# Patient Record
Sex: FEMALE | Race: WHITE | NOT HISPANIC OR LATINO
[De-identification: names, ages, dates, MRNs, and addresses within clinical notes are randomized per-mention and may not be internally consistent; named-entity substitution may affect disease eponyms.]

---

## 2018-01-19 ENCOUNTER — APPOINTMENT (OUTPATIENT)
Dept: PULMONOLOGY | Facility: CLINIC | Age: 38
End: 2018-01-19
Payer: COMMERCIAL

## 2018-01-19 VITALS
HEIGHT: 62 IN | BODY MASS INDEX: 24.11 KG/M2 | DIASTOLIC BLOOD PRESSURE: 70 MMHG | HEART RATE: 102 BPM | OXYGEN SATURATION: 99 % | TEMPERATURE: 102.9 F | WEIGHT: 131 LBS | SYSTOLIC BLOOD PRESSURE: 112 MMHG

## 2018-01-19 DIAGNOSIS — R05 COUGH: ICD-10-CM

## 2018-01-19 DIAGNOSIS — J06.9 ACUTE UPPER RESPIRATORY INFECTION, UNSPECIFIED: ICD-10-CM

## 2018-01-19 PROCEDURE — 99202 OFFICE O/P NEW SF 15 MIN: CPT

## 2020-12-16 PROBLEM — J06.9 URI, ACUTE: Status: RESOLVED | Noted: 2018-01-20 | Resolved: 2020-12-16

## 2022-02-14 ENCOUNTER — APPOINTMENT (OUTPATIENT)
Age: 42
End: 2022-02-14
Payer: COMMERCIAL

## 2022-02-14 DIAGNOSIS — R82.90 UNSPECIFIED ABNORMAL FINDINGS IN URINE: ICD-10-CM

## 2022-02-14 DIAGNOSIS — R82.998 OTHER ABNORMAL FINDINGS IN URINE: ICD-10-CM

## 2022-02-14 PROCEDURE — 99203 OFFICE O/P NEW LOW 30 MIN: CPT

## 2022-02-14 RX ORDER — SULFAMETHOXAZOLE AND TRIMETHOPRIM 800; 160 MG/1; MG/1
800-160 TABLET ORAL
Qty: 6 | Refills: 0 | Status: COMPLETED | COMMUNITY
Start: 2021-12-09

## 2022-02-14 RX ORDER — AZITHROMYCIN 250 MG/1
250 TABLET, FILM COATED ORAL
Qty: 1 | Refills: 0 | Status: COMPLETED | COMMUNITY
Start: 2018-01-20 | End: 2022-02-14

## 2022-02-21 PROBLEM — R82.998 CRYSTALLURIA: Status: ACTIVE | Noted: 2022-02-21

## 2022-02-21 LAB
APPEARANCE: ABNORMAL
BACTERIA UR CULT: NORMAL
BACTERIA: ABNORMAL
BILIRUBIN URINE: NEGATIVE
BLOOD URINE: NORMAL
CALCIUM OXALATE CRYSTALS: ABNORMAL
COLOR: ABNORMAL
GLUCOSE QUALITATIVE U: NEGATIVE
HYALINE CASTS: 4 /LPF
KETONES URINE: NEGATIVE
LEUKOCYTE ESTERASE URINE: NEGATIVE
MICROSCOPIC-UA: NORMAL
MYCOPLASMA HOMINIS CULTURE: NEGATIVE
NITRITE URINE: NEGATIVE
PH URINE: 5.5
PROTEIN URINE: ABNORMAL
RED BLOOD CELLS URINE: 1 /HPF
SPECIFIC GRAVITY URINE: 1.02
SQUAMOUS EPITHELIAL CELLS: 3 /HPF
UREAPLASMA CULTURE: NEGATIVE
UROBILINOGEN URINE: NORMAL
WHITE BLOOD CELLS URINE: 1 /HPF

## 2022-02-21 NOTE — REVIEW OF SYSTEMS
[Negative] : Heme/Lymph [History of kidney stones] : denies history of kidney stones [FreeTextEntry6] : foul smelling urine

## 2022-02-21 NOTE — HISTORY OF PRESENT ILLNESS
[FreeTextEntry1] : 40 yo F presents with history of UTI about 2 months ago\par Symptoms at that time - LUTS, dysuria, back pain, gross hematuria\par This was her first UTI ever and resolved with abx\par Last week - recurrence of back pain and smelly urine\par Per pt, drank a ton of water and symptoms resolved after a day\par Drinks 50 ounces of water, 1 cup of coffee\par Voiding about 3 -4 times per day\par no urinary symptoms\par normal bowel movements\par 2 children,  x2\par LMP = yesterday\par sexually active, no STI history

## 2022-02-21 NOTE — ASSESSMENT
[FreeTextEntry1] : 41y o F with history of recent UTI presents with foul smelling urine and some back pain\par \par - discussed possible etiologies for UTI. Spent extensive period of time discussed behavioral modification including adequate hydration, cutting back on caffeine intake, timed voiding during the day, importance of controlling any diabetes and chronic constipation and how all of these things could potentially increase risk for persistent or recurrent UTI.\par - UA, culture, ureaplasma/mycoplasma\par

## 2022-03-07 ENCOUNTER — APPOINTMENT (OUTPATIENT)
Dept: UROLOGY | Facility: CLINIC | Age: 42
End: 2022-03-07

## 2024-05-03 ENCOUNTER — APPOINTMENT (OUTPATIENT)
Dept: SURGICAL ONCOLOGY | Facility: CLINIC | Age: 44
End: 2024-05-03
Payer: COMMERCIAL

## 2024-05-03 VITALS
DIASTOLIC BLOOD PRESSURE: 80 MMHG | OXYGEN SATURATION: 99 % | SYSTOLIC BLOOD PRESSURE: 160 MMHG | WEIGHT: 110 LBS | BODY MASS INDEX: 20.24 KG/M2 | HEIGHT: 62 IN | HEART RATE: 56 BPM

## 2024-05-03 DIAGNOSIS — C50.911 MALIGNANT NEOPLASM OF UNSPECIFIED SITE OF RIGHT FEMALE BREAST: ICD-10-CM

## 2024-05-03 PROCEDURE — 99205 OFFICE O/P NEW HI 60 MIN: CPT

## 2024-05-03 NOTE — PHYSICAL EXAM
[de-identified] : resolving ecchymosis right breast 12:00 from recent biopsy. no masses or adenopathy bilaterally. A-cup breasts.

## 2024-05-03 NOTE — ADDENDUM
[FreeTextEntry1] : I, Nikki Cárdenas, acted solely as a scribe for Dr. Wm Brunner on this date 05/03/2024.

## 2024-05-03 NOTE — HISTORY OF PRESENT ILLNESS
[de-identified] : Patient is a 42 y/o female who presents an initial consultation for newly diagnosed right breast cancer.  Bx (4/24/24): Right breast 12:00 10 cm FN, core bx: -Invasive ductal carcinoma and DCIS, the invasive carcinoma is well to moderately differentiated, spans 0.7 cm in sampling - DCIS is of the micropapillary and cribriform types with intermediate to focal high grade nuclei and punctuate necrosis -Surrounding breast tissue shows atypical lobular hyperplasia (ALH) -ER+/OK+, HER2 FISH negative   6 month f/u US on 4/10/24 revealed new indeterminate palpable nodule in right breast 12:00, recc USGbx. (BI-RADS 4) Probably benign findings in the 12:00 right breast 2 cm FN , 3:00, 5:00, 6:00 and left breast 8:00 for which recc 6 month f/u US.   11/6/2023 - Right breast 12:00 2 cmFN 7 mm benign-appearing debris-containing cyst, increased from 4 mm - recc 6 month f/u - BI-RADS 3  Denies any family history of breast or ovarian cancer. Mother had pancreatic cancer. Father had prostate cancer. Prior needly biopsy many years ago.

## 2024-05-03 NOTE — ASSESSMENT
[FreeTextEntry1] : Right breast invasive cancer w/ DCIS & ALH ER+/ID+, HER2 by FISH negative I had a long discussion with the pt and her  regarding her diagnosis, prognosis and all management options  Surgical procedure discussed in detail All questions answered

## 2024-05-08 ENCOUNTER — OUTPATIENT (OUTPATIENT)
Dept: OUTPATIENT SERVICES | Facility: HOSPITAL | Age: 44
LOS: 1 days | End: 2024-05-08

## 2024-05-08 VITALS
OXYGEN SATURATION: 97 % | HEART RATE: 67 BPM | TEMPERATURE: 99 F | DIASTOLIC BLOOD PRESSURE: 85 MMHG | SYSTOLIC BLOOD PRESSURE: 141 MMHG | WEIGHT: 106.92 LBS | HEIGHT: 62.5 IN | RESPIRATION RATE: 16 BRPM

## 2024-05-08 DIAGNOSIS — C50.412 MALIGNANT NEOPLASM OF UPPER-OUTER QUADRANT OF LEFT FEMALE BREAST: ICD-10-CM

## 2024-05-08 DIAGNOSIS — C50.419 MALIGNANT NEOPLASM OF UPPER-OUTER QUADRANT OF UNSPECIFIED FEMALE BREAST: ICD-10-CM

## 2024-05-08 LAB
ANION GAP SERPL CALC-SCNC: 14 MMOL/L — SIGNIFICANT CHANGE UP (ref 7–14)
BLD GP AB SCN SERPL QL: NEGATIVE — SIGNIFICANT CHANGE UP
BUN SERPL-MCNC: 12 MG/DL — SIGNIFICANT CHANGE UP (ref 7–23)
CALCIUM SERPL-MCNC: 9 MG/DL — SIGNIFICANT CHANGE UP (ref 8.4–10.5)
CHLORIDE SERPL-SCNC: 102 MMOL/L — SIGNIFICANT CHANGE UP (ref 98–107)
CO2 SERPL-SCNC: 22 MMOL/L — SIGNIFICANT CHANGE UP (ref 22–31)
CREAT SERPL-MCNC: 0.58 MG/DL — SIGNIFICANT CHANGE UP (ref 0.5–1.3)
EGFR: 114 ML/MIN/1.73M2 — SIGNIFICANT CHANGE UP
GLUCOSE SERPL-MCNC: 79 MG/DL — SIGNIFICANT CHANGE UP (ref 70–99)
HCG UR QL: NEGATIVE — SIGNIFICANT CHANGE UP
HCT VFR BLD CALC: 40.4 % — SIGNIFICANT CHANGE UP (ref 34.5–45)
HGB BLD-MCNC: 13.8 G/DL — SIGNIFICANT CHANGE UP (ref 11.5–15.5)
MCHC RBC-ENTMCNC: 30.3 PG — SIGNIFICANT CHANGE UP (ref 27–34)
MCHC RBC-ENTMCNC: 34.2 GM/DL — SIGNIFICANT CHANGE UP (ref 32–36)
MCV RBC AUTO: 88.8 FL — SIGNIFICANT CHANGE UP (ref 80–100)
NRBC # BLD: 0 /100 WBCS — SIGNIFICANT CHANGE UP (ref 0–0)
NRBC # FLD: 0 K/UL — SIGNIFICANT CHANGE UP (ref 0–0)
PLATELET # BLD AUTO: 231 K/UL — SIGNIFICANT CHANGE UP (ref 150–400)
POTASSIUM SERPL-MCNC: 3.7 MMOL/L — SIGNIFICANT CHANGE UP (ref 3.5–5.3)
POTASSIUM SERPL-SCNC: 3.7 MMOL/L — SIGNIFICANT CHANGE UP (ref 3.5–5.3)
RBC # BLD: 4.55 M/UL — SIGNIFICANT CHANGE UP (ref 3.8–5.2)
RBC # FLD: 13.2 % — SIGNIFICANT CHANGE UP (ref 10.3–14.5)
RH IG SCN BLD-IMP: POSITIVE — SIGNIFICANT CHANGE UP
RH IG SCN BLD-IMP: POSITIVE — SIGNIFICANT CHANGE UP
SODIUM SERPL-SCNC: 138 MMOL/L — SIGNIFICANT CHANGE UP (ref 135–145)
WBC # BLD: 9.48 K/UL — SIGNIFICANT CHANGE UP (ref 3.8–10.5)
WBC # FLD AUTO: 9.48 K/UL — SIGNIFICANT CHANGE UP (ref 3.8–10.5)

## 2024-05-08 RX ORDER — SODIUM CHLORIDE 9 MG/ML
1000 INJECTION, SOLUTION INTRAVENOUS
Refills: 0 | Status: DISCONTINUED | OUTPATIENT
Start: 2024-05-15 | End: 2024-05-16

## 2024-05-08 NOTE — H&P PST ADULT - BREASTS COMMENTS
Preop dx Malignant neoplasm upper-outer Quadrant left female breast Preop dx Malignant neoplasm upper-outer Quadrant of  breast

## 2024-05-08 NOTE — H&P PST ADULT - PROBLEM SELECTOR PLAN 1
Scheduled for b/l mastectomy sentinel lymph node biopsy right Sonosaviscout. Preop dx malignant neoplasm upper outer quadrant female breast Scheduled for b/l mastectomy sentinel lymph node biopsy right [place].  Dr Chapman - b/l breast reconstruction with Precep implant and allo derm   Written & verbal preop instructions, gi prophylaxis & surgical soap given  Pt verbalized good understanding.  Teach back done on surgical soap instructions. Scheduled for b/l mastectomy sentinel lymph node biopsy right [place].  Dr Chapman - b/l breast reconstruction with Precep implant and allo derm.  Written & verbal preop instructions, gi prophylaxis & surgical soap given  Pt verbalized good understanding.  Teach back done on surgical soap instructions.

## 2024-05-08 NOTE — H&P PST ADULT - NSICDXFAMILYHX_GEN_ALL_CORE_FT
FAMILY HISTORY:  Father  Still living? Unknown  Family hx of prostate cancer, Age at diagnosis: Age Unknown    Mother  Still living? Unknown  Family history of pancreatic cancer, Age at diagnosis: Age Unknown  FH: type 2 diabetes, Age at diagnosis: Age Unknown

## 2024-05-08 NOTE — H&P PST ADULT - HISTORY OF PRESENT ILLNESS
44y/o female presents for preop eval for scheduled   Pt states self detected right breast lump approx one month ago.  Evaluated by GYN sonogram done, confirmed right breast lump, repeat ultrasound with biopsy done.  Dx with breast cancer and referred  to Dr Kiser (surgeon)   42y/o female presents for preop eval for scheduled  for b/l mastectomy sentinel lymph node biopsy right [place].  Dr Chapman - b/l breast reconstruction with Precep implant and allo derm.    H/o excision of benign left breast mass 2005.  Pt states self detected right breast lump approx one month ago.  Evaluated by GYN sonogram done, confirmed right breast lump, repeat ultrasound with biopsy done.  Dx with breast cancer and referred  to Dr Kiser (surgeon)

## 2024-05-08 NOTE — H&P PST ADULT - NSICDXPASTMEDICALHX_GEN_ALL_CORE_FT
PAST MEDICAL HISTORY:  Breast Mass ( Left) ' 2005. benign    Breech Presentation ' 2009    Gestational Diabetes ' 2009 and current pregnancy ( ' 2011).  ' 09 treated with Insulin. ' 2011: diet managed.    Multiparity      PAST MEDICAL HISTORY:  Breast Mass ( Left) ' 2005. benign    Breech Presentation ' 2009    Gestational Diabetes ' 2009 and current pregnancy ( ' 2011).  ' 09 treated with Insulin. ' 2011: diet managed.    Malignant neoplasm of upper outer quadrant of female breast     Multiparity

## 2024-05-08 NOTE — H&P PST ADULT - PROBLEM SELECTOR PROBLEM 1
Primary malignant neoplasm of upper outer quadrant of left breast Malignant neoplasm of upper-outer quadrant of breast

## 2024-05-09 ENCOUNTER — APPOINTMENT (OUTPATIENT)
Dept: PLASTIC SURGERY | Facility: CLINIC | Age: 44
End: 2024-05-09

## 2024-05-10 PROBLEM — C50.419 MALIGNANT NEOPLASM OF UPPER-OUTER QUADRANT OF UNSPECIFIED FEMALE BREAST: Chronic | Status: ACTIVE | Noted: 2024-05-08

## 2024-05-14 ENCOUNTER — TRANSCRIPTION ENCOUNTER (OUTPATIENT)
Age: 44
End: 2024-05-14

## 2024-05-14 ENCOUNTER — APPOINTMENT (OUTPATIENT)
Dept: ULTRASOUND IMAGING | Facility: IMAGING CENTER | Age: 44
End: 2024-05-14
Payer: COMMERCIAL

## 2024-05-14 ENCOUNTER — OUTPATIENT (OUTPATIENT)
Dept: OUTPATIENT SERVICES | Facility: HOSPITAL | Age: 44
LOS: 1 days | End: 2024-05-14
Payer: COMMERCIAL

## 2024-05-14 DIAGNOSIS — Z00.8 ENCOUNTER FOR OTHER GENERAL EXAMINATION: ICD-10-CM

## 2024-05-14 PROCEDURE — 19285 PERQ DEV BREAST 1ST US IMAG: CPT

## 2024-05-14 PROCEDURE — C1739: CPT

## 2024-05-14 PROCEDURE — 19285 PERQ DEV BREAST 1ST US IMAG: CPT | Mod: RT

## 2024-05-14 NOTE — ASU PATIENT PROFILE, ADULT - NSICDXPASTMEDICALHX_GEN_ALL_CORE_FT
PAST MEDICAL HISTORY:  Breast Mass ( Left) ' 2005. benign    Breech Presentation ' 2009    Gestational Diabetes ' 2009 and current pregnancy ( ' 2011).  ' 09 treated with Insulin. ' 2011: diet managed.    Malignant neoplasm of upper outer quadrant of female breast     Multiparity

## 2024-05-15 ENCOUNTER — APPOINTMENT (OUTPATIENT)
Dept: NUCLEAR MEDICINE | Facility: HOSPITAL | Age: 44
End: 2024-05-15

## 2024-05-15 ENCOUNTER — INPATIENT (INPATIENT)
Facility: HOSPITAL | Age: 44
LOS: 0 days | Discharge: HOME | End: 2024-05-16
Attending: SURGERY | Admitting: SURGERY
Payer: COMMERCIAL

## 2024-05-15 VITALS
SYSTOLIC BLOOD PRESSURE: 123 MMHG | OXYGEN SATURATION: 97 % | WEIGHT: 106.92 LBS | DIASTOLIC BLOOD PRESSURE: 73 MMHG | RESPIRATION RATE: 16 BRPM | HEART RATE: 71 BPM | HEIGHT: 62 IN | TEMPERATURE: 98 F

## 2024-05-15 DIAGNOSIS — C50.412 MALIGNANT NEOPLASM OF UPPER-OUTER QUADRANT OF LEFT FEMALE BREAST: ICD-10-CM

## 2024-05-15 LAB — HCG UR QL: NEGATIVE — SIGNIFICANT CHANGE UP

## 2024-05-15 PROCEDURE — 88332 PATH CONSLTJ SURG EA ADD BLK: CPT | Mod: 26

## 2024-05-15 PROCEDURE — 88307 TISSUE EXAM BY PATHOLOGIST: CPT | Mod: 26

## 2024-05-15 PROCEDURE — 76098 X-RAY EXAM SURGICAL SPECIMEN: CPT | Mod: 26

## 2024-05-15 PROCEDURE — 88309 TISSUE EXAM BY PATHOLOGIST: CPT | Mod: 26

## 2024-05-15 PROCEDURE — 71045 X-RAY EXAM CHEST 1 VIEW: CPT | Mod: 26

## 2024-05-15 PROCEDURE — 88305 TISSUE EXAM BY PATHOLOGIST: CPT | Mod: 26

## 2024-05-15 PROCEDURE — 88331 PATH CONSLTJ SURG 1 BLK 1SPC: CPT | Mod: 26

## 2024-05-15 PROCEDURE — 88304 TISSUE EXAM BY PATHOLOGIST: CPT | Mod: 26

## 2024-05-15 DEVICE — IMPLANTABLE DEVICE: Type: IMPLANTABLE DEVICE | Status: FUNCTIONAL

## 2024-05-15 RX ORDER — OXYCODONE HYDROCHLORIDE 5 MG/1
2.5 TABLET ORAL EVERY 6 HOURS
Refills: 0 | Status: DISCONTINUED | OUTPATIENT
Start: 2024-05-15 | End: 2024-05-16

## 2024-05-15 RX ORDER — OXYCODONE HYDROCHLORIDE 5 MG/1
5 TABLET ORAL EVERY 6 HOURS
Refills: 0 | Status: DISCONTINUED | OUTPATIENT
Start: 2024-05-15 | End: 2024-05-16

## 2024-05-15 RX ORDER — ONDANSETRON 8 MG/1
4 TABLET, FILM COATED ORAL ONCE
Refills: 0 | Status: COMPLETED | OUTPATIENT
Start: 2024-05-15 | End: 2024-05-15

## 2024-05-15 RX ORDER — ACETAMINOPHEN 500 MG
1000 TABLET ORAL EVERY 6 HOURS
Refills: 0 | Status: COMPLETED | OUTPATIENT
Start: 2024-05-15 | End: 2024-05-16

## 2024-05-15 RX ORDER — IBUPROFEN 200 MG
400 TABLET ORAL EVERY 6 HOURS
Refills: 0 | Status: DISCONTINUED | OUTPATIENT
Start: 2024-05-16 | End: 2024-05-16

## 2024-05-15 RX ADMIN — OXYCODONE HYDROCHLORIDE 5 MILLIGRAM(S): 5 TABLET ORAL at 20:30

## 2024-05-15 RX ADMIN — SODIUM CHLORIDE 30 MILLILITER(S): 9 INJECTION, SOLUTION INTRAVENOUS at 23:04

## 2024-05-15 RX ADMIN — ONDANSETRON 4 MILLIGRAM(S): 8 TABLET, FILM COATED ORAL at 19:58

## 2024-05-15 RX ADMIN — OXYCODONE HYDROCHLORIDE 5 MILLIGRAM(S): 5 TABLET ORAL at 20:00

## 2024-05-15 RX ADMIN — Medication 400 MILLIGRAM(S): at 23:04

## 2024-05-15 NOTE — BRIEF OPERATIVE NOTE - OPERATION/FINDINGS
Bilateral prepec direct to implant reconstruction with alloderm following mastectomy
B/l nipple sparring mastectomy w b/l SNL bx. Left side performed first. Breast removed. [place] [address] x3 sent to pathology. Right side (cancer ) excised second. Confirmed VADIM and clip on xray. Right sentinel LNx2.

## 2024-05-15 NOTE — BRIEF OPERATIVE NOTE - SPECIMENS
none per prs
Right sentinel LN. Right breast. Right retro areolar tissue. Right axillary tissue. Left sentinel LN. Left breast. Left retro areolar tissue. Left axillary tissue.

## 2024-05-15 NOTE — BRIEF OPERATIVE NOTE - NSICDXBRIEFPROCEDURE_GEN_ALL_CORE_FT
PROCEDURES:  Mastectomy, bilateral, nipple sparing, with bilateral sentinel lymph node biopsy 15-May-2024 17:20:26  Beba Ho  
PROCEDURES:  Bilateral mastectomy with reconstruction using implants 15-May-2024 19:00:06  Inderjit Maya

## 2024-05-15 NOTE — CHART NOTE - NSCHARTNOTEFT_GEN_A_CORE
General Surgery Post op Check    Pt seen and examined at bedside post-operatively. Pain is controlled. Endorses mild nausea responding to antiemetics.     Vital Signs Last 24 Hrs  T(C): 36.8 (15 May 2024 22:15), Max: 37 (15 May 2024 21:00)  T(F): 98.2 (15 May 2024 22:15), Max: 98.6 (15 May 2024 21:00)  HR: 69 (15 May 2024 22:15) (69 - 84)  BP: 135/89 (15 May 2024 22:15) (123/73 - 151/82)  BP(mean): 86 (15 May 2024 21:45) (79 - 100)  RR: 16 (15 May 2024 22:15) (12 - 20)  SpO2: 100% (15 May 2024 22:15) (96% - 100%)    Parameters below as of 15 May 2024 22:15  Patient On (Oxygen Delivery Method): room air        I&O's Summary    15 May 2024 07:01  -  15 May 2024 22:53  --------------------------------------------------------  IN: 115 mL / OUT: 102 mL / NET: 13 mL        Physical Exam  Gen: NAD, A&Ox3  Pulm: No respiratory distress, no subcostal retractions  CV: RRR, no JVD  Breast: Incisions c/d/i, no significant edema or hematoma.   Drains: JPx4 w/ SS output         A/P: 43y Female s/p B/l nipple sparring mastectomy w b/l SNL bx, reconstruction using implants by plastics. Patient hemodynamically stable recovering well post-procedure.     -DVT prophylaxis w/ SCD.    -Encouraged OOB  -Diet: Regular  -Pain control: Tylenol, Motrin, Oxycodone PRN, NO TORADOL   -Strict I&O's  -Monitor ERIKA drain output  -Antiemetics as needed  -Dispo: Floor

## 2024-05-15 NOTE — PATIENT PROFILE ADULT - FALL HARM RISK - RISK INTERVENTIONS
Assistance OOB with selected safe patient handling equipment/Assistance with ambulation/Communicate Fall Risk and Risk Factors to all staff, patient, and family/Monitor gait and stability/Reinforce activity limits and safety measures with patient and family/Sit up slowly, dangle for a short time, stand at bedside before walking/Use of alarms - bed, chair and/or voice tab/Visual Cue: Yellow wristband/Bed in lowest position, wheels locked, appropriate side rails in place/Call bell, personal items and telephone in reach/Instruct patient to call for assistance before getting out of bed or chair/Non-slip footwear when patient is out of bed/Coker to call system/Physically safe environment - no spills, clutter or unnecessary equipment/Purposeful Proactive Rounding/Room/bathroom lighting operational, light cord in reach

## 2024-05-16 ENCOUNTER — TRANSCRIPTION ENCOUNTER (OUTPATIENT)
Age: 44
End: 2024-05-16

## 2024-05-16 VITALS — DIASTOLIC BLOOD PRESSURE: 59 MMHG | SYSTOLIC BLOOD PRESSURE: 119 MMHG

## 2024-05-16 RX ORDER — ACETAMINOPHEN 500 MG
0 TABLET ORAL
Qty: 0 | Refills: 0 | DISCHARGE
Start: 2024-05-16

## 2024-05-16 RX ORDER — IBUPROFEN 200 MG
0 TABLET ORAL
Qty: 0 | Refills: 0 | DISCHARGE
Start: 2024-05-16

## 2024-05-16 RX ORDER — OXYCODONE HYDROCHLORIDE 5 MG/1
1 TABLET ORAL
Qty: 12 | Refills: 0
Start: 2024-05-16 | End: 2024-05-18

## 2024-05-16 RX ADMIN — Medication 400 MILLIGRAM(S): at 12:18

## 2024-05-16 RX ADMIN — Medication 400 MILLIGRAM(S): at 08:50

## 2024-05-16 RX ADMIN — Medication 1000 MILLIGRAM(S): at 00:04

## 2024-05-16 RX ADMIN — SODIUM CHLORIDE 30 MILLILITER(S): 9 INJECTION, SOLUTION INTRAVENOUS at 07:54

## 2024-05-16 RX ADMIN — Medication 1000 MILLIGRAM(S): at 06:32

## 2024-05-16 RX ADMIN — SODIUM CHLORIDE 30 MILLILITER(S): 9 INJECTION, SOLUTION INTRAVENOUS at 05:32

## 2024-05-16 RX ADMIN — Medication 400 MILLIGRAM(S): at 07:54

## 2024-05-16 RX ADMIN — Medication 400 MILLIGRAM(S): at 05:32

## 2024-05-16 NOTE — DISCHARGE NOTE PROVIDER - NSDCACTIVITY_GEN_ALL_CORE
Showering allowed/No heavy lifting/straining/Follow Instructions Provided by your Surgical Team/Activity as tolerated

## 2024-05-16 NOTE — DISCHARGE NOTE PROVIDER - HOSPITAL COURSE
42y/o female  Pt states self detected right breast lump approx one month ago.  had outpatient work up and found with Breast CA. On 5/15/2024 Pt. underwent B/L Mastectomy with Reconstruction part was done by Plastic Surgeon. Surgery was uneventful and Pt. with uneventful Post op course. Pain was well control. 4 ERIKA drains SS. Pt. D/C in stable condition and to follow up with Breast Surgeon and Plastic Surgeon as outpatient.

## 2024-05-16 NOTE — DISCHARGE NOTE NURSING/CASE MANAGEMENT/SOCIAL WORK - NSDCPEFALRISK_GEN_ALL_CORE
For information on Fall & Injury Prevention, visit: [email or web address] OR  [email or web address] OR  https://www.cdc.gov/steadi/patient.html

## 2024-05-16 NOTE — DISCHARGE NOTE PROVIDER - NSDCCPCAREPLAN_GEN_ALL_CORE_FT
PRINCIPAL DISCHARGE DIAGNOSIS  Diagnosis: Malignant neoplasm of breast  Assessment and Plan of Treatment:      PRINCIPAL DISCHARGE DIAGNOSIS  Diagnosis: Malignant neoplasm of breast  Assessment and Plan of Treatment: DRAIN CARE: Jewel Vasquez drain. If you go home with a ERIKA drain it is important that you measure the fluid (output) and keep a record of it daily. Please bring your log of the daily outputs to your follow-up appointment. Keep the drain secured to your clothing with a safety pin at all times to avoid accidental displacement. Monitor the insertion site for redness, pain, swelling, or purulent drainage.  You may shower with the drain. Wash around the drain with soap and water, pat dry, and reapply dressing. If you noticed a sudden change in the color or amount of fluid in the drain, please contact your surgeon’s office.  Your drain will be removed at your follow up appointment.

## 2024-05-16 NOTE — PROGRESS NOTE ADULT - SUBJECTIVE AND OBJECTIVE BOX
TEAM [ ** ] Surgery Daily Progress Note  =====================================================    SUBJECTIVE: Patient seen and examined at bedside on AM rounds. Patient reports feeling well, pain controlled on current regimen. NPO/Tolerating diet, denies nausea, vomiting.    Flatus/    BM. OOB/Amublating as tolerated. Denies fever, chills.    ALLERGIES:  No Known Allergies      --------------------------------------------------------------------------------------    MEDICATIONS:    Neurologic Medications  acetaminophen   IVPB .. 1000 milliGRAM(s) IV Intermittent every 6 hours  ibuprofen  Tablet. 400 milliGRAM(s) Oral every 6 hours  oxyCODONE    IR 2.5 milliGRAM(s) Oral every 6 hours PRN Moderate Pain (4 - 6)  oxyCODONE    IR 5 milliGRAM(s) Oral every 6 hours PRN Severe Pain (7 - 10)    Respiratory Medications    Cardiovascular Medications    Gastrointestinal Medications  lactated ringers. 1000 milliLiter(s) IV Continuous <Continuous>    Genitourinary Medications    Hematologic/Oncologic Medications    Antimicrobial/Immunologic Medications    Endocrine/Metabolic Medications    Topical/Other Medications    --------------------------------------------------------------------------------------    VITAL SIGNS:  T(C): 36.8 (05-16-24 @ 05:31), Max: 37 (05-15-24 @ 21:00)  HR: 69 (05-16-24 @ 05:31) (68 - 84)  BP: 120/60 (05-16-24 @ 05:31) (120/60 - 151/82)  RR: 16 (05-16-24 @ 05:31) (12 - 20)  SpO2: 99% (05-16-24 @ 05:31) (96% - 100%)  --------------------------------------------------------------------------------------    EXAM    General: NAD, resting in bed comfortably.  Cardiac: regular rate, warm and well perfused  Respiratory: Nonlabored respirations, normal cw expansion.  Breast Wounds: c/d/i. 4 ERIKA with SS. + mild R chest wall TTP superior outer aspect  Abdomen: soft, nontender, nondistended.  Extremities: normal strength, FROM, no deformities    --------------------------------------------------------------------------------------    LABS      --------------------------------------------------------------------------------------    INS AND OUTS:    05-15-24 @ 07:01  -  05-16-24 @ 07:00  --------------------------------------------------------  IN: 825 mL / OUT: 1232.5 mL / NET: -407.5 mL      --------------------------------------------------------------------------------------

## 2024-05-16 NOTE — DISCHARGE NOTE PROVIDER - PROVIDER TOKENS
PROVIDER:[TOKEN:[[id number]],FOLLOWUP:[2 weeks]],PROVIDER:[TOKEN:[[id number]],FOLLOWUP:[1 week],ESTABLISHEDPATIENT:[T]]

## 2024-05-16 NOTE — DISCHARGE NOTE PROVIDER - NSDCCPTREATMENT_GEN_ALL_CORE_FT
PRINCIPAL PROCEDURE  Procedure: Nipple sparing mastectomy  Findings and Treatment: breast reconstruction

## 2024-05-16 NOTE — PROGRESS NOTE ADULT - ASSESSMENT
43F s/p b/l mx and DTI with alloderm 5/15. Recovering well.    Plan:  - Ok for dc  - Has scripts from Dr. Chapman's office  - Keep dressings on and dry  - ERIKA drain care    PRS  [id number]
      42 yo F s/p B/l Mastectomy with reconstruction with Implants.   -Diet as tolerated  -Pain control  -Plan for D/C. Will coordinate with PLastic service for Rec.     Team E Surgery  [id number]

## 2024-05-16 NOTE — DISCHARGE NOTE NURSING/CASE MANAGEMENT/SOCIAL WORK - NSDCPECAREGIVERED_GEN_ALL_CORE
Pt discharged with Extra Jobst Bra Medium, medicine cups, alcohol pads, & gauze. Pt given written drain instructions as well as a drain log for 4 drains/Yes

## 2024-05-16 NOTE — DISCHARGE NOTE PROVIDER - NSDCFUADDINST_GEN_ALL_CORE_FT
KEEP WOUND CLEAN AND DRY  KEEP BRA ON PER PLASTIC SURGEON INSTRUCTION    WOUND CARE:  Please keep incisions clean and dry. Please do not Scrub or rub incisions. Do not use lotion or powder on incisions.   BATHING: You may shower and/or sponge bathe. You may use warm soapy water in the shower and rinse, pat dry.  ACTIVITY: No heavy lifting or straining. Otherwise, you may return to your usual level of physical activity. If you are taking narcotic pain medication DO NOT drive a car, operate machinery or make important decisions.  DIET: Return to your usual diet.  NOTIFY YOUR SURGEON IF YOU HAVE: any bleeding that does not stop, any pus draining from your wound(s), any fever (over 100.4 F) persistent nausea/vomiting, or if your pain is not controlled on your discharge pain medications, unable to urinate.  Please follow up with your primary care physician in one week regarding your hospitalization, bring copies of your discharge paperwork.  Please follow up with your surgeon,    KEEP WOUND CLEAN AND DRY  KEEP BRA ON PER PLASTIC SURGEON INSTRUCTION    WOUND CARE:  Please keep incisions clean and dry. Please do not Scrub or rub incisions. Do not use lotion or powder on incisions.   BATHING: You may shower and/or sponge bathe. You may use warm soapy water in the shower and rinse, pat dry.  ACTIVITY: No heavy lifting or straining. Otherwise, you may return to your usual level of physical activity. If you are taking narcotic pain medication DO NOT drive a car, operate machinery or make important decisions.  DIET: Return to your usual diet.  NOTIFY YOUR SURGEON IF YOU HAVE: any bleeding that does not stop, any pus draining from your wound(s), any fever (over 100.4 F) persistent nausea/vomiting, or if your pain is not controlled on your discharge pain medications, unable to urinate.  Please follow up with your primary care physician in one week regarding your hospitalization, bring copies of your discharge paperwork.  Please follow up with your breast surgeon, Dr. Kiser, in 2 weeks. Call [phone] to make an appointment.   Please follow up with your plastic surgeon, Dr. Chapman, in 1 week. Call [phone] to make an appointment.

## 2024-05-16 NOTE — DISCHARGE NOTE PROVIDER - CARE PROVIDER_API CALL
Radha Kiser-Jackeline  Surgery  [address], [address]  [place] [address]  Phone: [phone]  Fax: [phone]  Follow Up Time: 2 weeks    Rosendo Chapman  Plastic Surgery  [address], [address]  [place] [address]  Phone: [phone]  Fax: [phone]  Established Patient  Follow Up Time: 1 week

## 2024-05-16 NOTE — DISCHARGE NOTE NURSING/CASE MANAGEMENT/SOCIAL WORK - PATIENT PORTAL LINK FT
You can access the FollowMyHealth Patient Portal offered by [facility] by registering at the following website: [email or web address]. By joining [facility]’s FollowMyHealth portal, you will also be able to view your health information using other applications (apps) compatible with our system.

## 2024-05-16 NOTE — DISCHARGE NOTE PROVIDER - NSDCMRMEDTOKEN_GEN_ALL_CORE_FT
acetaminophen 500 mg oral capsule: orally 4 times a day as needed for  mild pain  Ravindra - OTC po supplement - 1 daily  LD 5/8/24:   ibuprofen 400 mg oral tablet: orally every 8 hours as needed for  moderate pain

## 2024-05-16 NOTE — PROGRESS NOTE ADULT - SUBJECTIVE AND OBJECTIVE BOX
Plastic Surgery Progress Note (pg [facility]: [id number], [facility]: [phone])    SUBJECTIVE  Pt comfortable in bed. Pain well controlled, no complaints.    OBJECTIVE  ___________________________________________________  VITAL SIGNS / I&O's   Vital Signs Last 24 Hrs  T(C): 36.8 (16 May 2024 05:31), Max: 37 (15 May 2024 21:00)  T(F): 98.3 (16 May 2024 05:31), Max: 98.6 (15 May 2024 21:00)  HR: 69 (16 May 2024 05:31) (68 - 84)  BP: 120/60 (16 May 2024 05:31) (120/60 - 151/82)  BP(mean): 86 (15 May 2024 21:45) (79 - 100)  RR: 16 (16 May 2024 05:31) (12 - 20)  SpO2: 99% (16 May 2024 05:31) (96% - 100%)    Parameters below as of 16 May 2024 05:31  Patient On (Oxygen Delivery Method): room air          15 May 2024 07:01  -  16 May 2024 07:00  --------------------------------------------------------  IN:    IV PiggyBack: 200 mL    Lactated Ringers: 360 mL    Oral Fluid: 265 mL  Total IN: 825 mL    OUT:    Drain (mL): 51 mL    Drain (mL): 67.5 mL    Drain (mL): 54.5 mL    Drain (mL): 59.5 mL    Voided (mL): 1000 mL  Total OUT: 1232.5 mL    Total NET: -407.5 mL        ___________________________________________________  PHYSICAL EXAM    General: NAD  Breasts: Soft, nontender. JPs SS    ___________________________________________________  LABS            CAPILLARY BLOOD GLUCOSE              ___________________________________________________  MICRO  Recent Cultures:    ___________________________________________________  MEDICATIONS  (STANDING):  acetaminophen   IVPB .. 1000 milliGRAM(s) IV Intermittent every 6 hours  ibuprofen  Tablet. 400 milliGRAM(s) Oral every 6 hours  lactated ringers. 1000 milliLiter(s) (30 mL/Hr) IV Continuous <Continuous>    MEDICATIONS  (PRN):  oxyCODONE    IR 2.5 milliGRAM(s) Oral every 6 hours PRN Moderate Pain (4 - 6)  oxyCODONE    IR 5 milliGRAM(s) Oral every 6 hours PRN Severe Pain (7 - 10)   Plastic Surgery Progress Note (pg [facility]: [id number], [facility]: [phone])    SUBJECTIVE  Pt comfortable in bed. Pain well controlled, no complaints. Patient already evaluated by Dr. Chapman    OBJECTIVE  ___________________________________________________  VITAL SIGNS / I&O's   Vital Signs Last 24 Hrs  T(C): 36.8 (16 May 2024 05:31), Max: 37 (15 May 2024 21:00)  T(F): 98.3 (16 May 2024 05:31), Max: 98.6 (15 May 2024 21:00)  HR: 69 (16 May 2024 05:31) (68 - 84)  BP: 120/60 (16 May 2024 05:31) (120/60 - 151/82)  BP(mean): 86 (15 May 2024 21:45) (79 - 100)  RR: 16 (16 May 2024 05:31) (12 - 20)  SpO2: 99% (16 May 2024 05:31) (96% - 100%)    Parameters below as of 16 May 2024 05:31  Patient On (Oxygen Delivery Method): room air          15 May 2024 07:01  -  16 May 2024 07:00  --------------------------------------------------------  IN:    IV PiggyBack: 200 mL    Lactated Ringers: 360 mL    Oral Fluid: 265 mL  Total IN: 825 mL    OUT:    Drain (mL): 51 mL    Drain (mL): 67.5 mL    Drain (mL): 54.5 mL    Drain (mL): 59.5 mL    Voided (mL): 1000 mL  Total OUT: 1232.5 mL    Total NET: -407.5 mL        ___________________________________________________  PHYSICAL EXAM    General: NAD  Breasts: Soft, nontender. JPs SS    ___________________________________________________  LABS            CAPILLARY BLOOD GLUCOSE              ___________________________________________________  MICRO  Recent Cultures:    ___________________________________________________  MEDICATIONS  (STANDING):  acetaminophen   IVPB .. 1000 milliGRAM(s) IV Intermittent every 6 hours  ibuprofen  Tablet. 400 milliGRAM(s) Oral every 6 hours  lactated ringers. 1000 milliLiter(s) (30 mL/Hr) IV Continuous <Continuous>    MEDICATIONS  (PRN):  oxyCODONE    IR 2.5 milliGRAM(s) Oral every 6 hours PRN Moderate Pain (4 - 6)  oxyCODONE    IR 5 milliGRAM(s) Oral every 6 hours PRN Severe Pain (7 - 10)

## 2024-05-16 NOTE — DISCHARGE NOTE NURSING/CASE MANAGEMENT/SOCIAL WORK - NSDCPNINST_GEN_ALL_CORE
Please call provider for a fever of 100.4F or greater, chills, pain not relieved by pain medicine, or drainage/oozing at incision sites

## 2024-05-20 LAB — SURGICAL PATHOLOGY STUDY: SIGNIFICANT CHANGE UP

## 2024-05-30 PROBLEM — C50.911 MALIGNANT NEOPLASM OF RIGHT FEMALE BREAST, UNSPECIFIED ESTROGEN RECEPTOR STATUS, UNSPECIFIED SITE OF BREAST: Status: ACTIVE | Noted: 2024-05-30

## 2024-06-07 ENCOUNTER — OUTPATIENT (OUTPATIENT)
Dept: OUTPATIENT SERVICES | Facility: HOSPITAL | Age: 44
LOS: 1 days | End: 2024-06-07

## 2024-06-07 VITALS
DIASTOLIC BLOOD PRESSURE: 80 MMHG | RESPIRATION RATE: 16 BRPM | HEART RATE: 58 BPM | OXYGEN SATURATION: 98 % | HEIGHT: 63 IN | TEMPERATURE: 98 F | WEIGHT: 106.92 LBS | SYSTOLIC BLOOD PRESSURE: 130 MMHG

## 2024-06-07 DIAGNOSIS — Z98.890 OTHER SPECIFIED POSTPROCEDURAL STATES: Chronic | ICD-10-CM

## 2024-06-07 DIAGNOSIS — Z90.13 ACQUIRED ABSENCE OF BILATERAL BREASTS AND NIPPLES: Chronic | ICD-10-CM

## 2024-06-07 DIAGNOSIS — C50.111 MALIGNANT NEOPLASM OF CENTRAL PORTION OF RIGHT FEMALE BREAST: ICD-10-CM

## 2024-06-07 DIAGNOSIS — Z90.13 ACQUIRED ABSENCE OF BILATERAL BREASTS AND NIPPLES: ICD-10-CM

## 2024-06-07 LAB — HCG UR QL: NEGATIVE — SIGNIFICANT CHANGE UP

## 2024-06-07 NOTE — H&P PST ADULT - NSICDXPASTSURGICALHX_GEN_ALL_CORE_FT
PAST SURGICAL HISTORY:   Section for Breech Presentation '     H/O breast reconstruction     Left Breast Biopsy ' . benign    S/P bilateral mastectomy

## 2024-06-07 NOTE — H&P PST ADULT - OPHTHALMOLOGIC
Spoke with patient requesting pads for women , pending order for provider need to fax over to [employer] please reply .   negative

## 2024-06-07 NOTE — H&P PST ADULT - ASSESSMENT
Malignant Neoplasm Central Portion right breast, personal history of primary malignant neoplasm breast, Acquired absence of bilateral breasts

## 2024-06-07 NOTE — H&P PST ADULT - PROBLEM SELECTOR PLAN 1
Patient is tentatively scheduled for excision of right chest wall anterior margin, right breast closure with adjacent tissue transfer on 6/12/24. Pre-op instructions provided to patient. Patient given verbal and written instructions on Chlorhexidine soap and Pepcid. Sterile cup given, pt instructed to bring urine sample AM of surgery for UCG. Patient verbalized understanding.     UCG sent at [facility]  BMP, CBC done 5/8/24, available in HIE. No changes in medical condition since last visit, labs not indicated.

## 2024-06-07 NOTE — H&P PST ADULT - HISTORY OF PRESENT ILLNESS
44 year old female with right breast cancer s/p bilateral nipple sparing mastectomy, b/l sentinal lymph node biopsy and  b/l breast reconstruction with Precep implant and alloderm on 5/15/24. Patient found to have positive margins postoperatively. Patient is scheduled for excision of right chest wall anterior margin, right breast closure with adjacent tissue transfer.

## 2024-06-07 NOTE — H&P PST ADULT - PRO INTERPRETER NEED 2
- patient presenting with mechanical fall found to have right shoulder dislocation on XR  - s/p failed attempt of manual reduction   - admitted to orthopedic service  - plan for close reduction surgical intervention as per Ortho PA 10/26/23  - patient euvolemic on exam, METS >4, noted with murmur in aortic region systolic   - NPO  - IVF hydration   - pain control, acetaminophen prn for mild pain, morphine for severe pain   - can continue home bp meds prior to surgery   - f/u ekg   - f/u Pre op labs   - RCRI: Class 1 risk: 3.9% 30 day risk of cardiac arrest, MI or death   - FRAZIER:  0.2% risk of myocardial infarction or cardiac arrest intraoperatively or 30 days post op  - Prior to OR intervention patient will need Echocardiogram and consider Cardiology consult English

## 2024-06-07 NOTE — H&P PST ADULT - SKIN/BREAST COMMENTS
Pre-op Diagnosis: Malignant Neoplasm Central Portion right breast, personal history of primary malignant neoplasm breast, Acquired absence of bilateral breasts

## 2024-06-11 ENCOUNTER — TRANSCRIPTION ENCOUNTER (OUTPATIENT)
Age: 44
End: 2024-06-11

## 2024-06-11 NOTE — ASU PATIENT PROFILE, ADULT - NSICDXPASTSURGICALHX_GEN_ALL_CORE_FT
Discharge    Today's date: 2018  Patient name: Marisela Vo  : 1951  MRN: [id number]  Referring provider: Lisa Washington MD  Dx:   Encounter Diagnosis     ICD-10-CM    1  Left leg pain M79 [id number]        Start Time: 940  Stop Time: 1100  Total time in clinic (min): 80 minutes    Assessment  Assessment details: Marisela Vo is a 75 y o  male who presents with pain, decreased strength, decreased ROM, decreased joint mobility, decreased sensation, joint effusion, impaired sensation and ambulatory dysfunction  Due to these impairments, Patient has difficulty performing a/iadls, recreational activities, work-related activities and engaging in social activities  Patient's clinical presentation is consistent with their referring diagnosis of left LE pain and polyneuropathy  Patient will continue to benefit from skilled physical therapy to address their aforementioned impairments, improve their level of function and to improve their overall quality of life  He will continue with aquatic PT with manual stretching/mobs afterwards as tolerated  He will progress to land PT to increase strengthening against gravity  Understanding of Dx/Px/POC: excellent   Prognosis: good    Plan  Plan details: Will continue aquatic PT 1-2x/week followed by manual stretching and mobilizations as tolerated  Will progress to land PT for strengthening against gravity  He will also continue a fitness/HEP on his off days  Planned therapy interventions: aquatic therapy, manual therapy, joint mobilization, therapeutic activities, therapeutic exercise, therapeutic training, home exercise program and flexibility  Frequency: 2x week  Duration in weeks: 12  Treatment plan discussed with: patient        Subjective Evaluation    History of Present Illness  Mechanism of injury: Patient reports symptoms began approx  8-9  months ago and had become progressively worse    Has had pain management injections without relief and also chiropractic treatments which increased the symptoms  He continued to have severe pain in the posterior left LE (sciatica), inner thigh (adductor)and in both feet from the neuropathy  Aquatic program appears to be decreasing symptoms slowly  He is reporting more relief in the symptoms    Pain  Current pain ratin  At best pain ratin  At worst pain ratin  Quality: radiating, discomfort and throbbing  Relieving factors: rest, change in position and medications  Aggravating factors: stair climbing, walking and lifting  Progression: improved    Treatments  Previous treatment: physical therapy  Current treatment: physical therapy  Patient Goals  Patient goals for therapy: decreased pain, increased motion, increased strength, independence with ADLs/IADLs and return to sport/leisure activities  Patient goal: Longer walking distances        Objective     Static Posture     Comments  Palpation   Left   Tenderness of the piriformis but has decreased since last re-eval      Neurological Testing     Sensation     Lumbar   Left   Diminished: light touch, pin prick and sharp/dull discrimination    Right   Intact: light touch, pin prick and sharp/dull discrimination    Active Range of Motion     Lumbar   Flexion: 65 degrees   Extension: 10 degrees     Left LE:  Knee- WFLs  Left ankle is decreased in DF secondary to drop foot      Flowsheet Rows      Most Recent Value   PT/OT G-Codes   Current Score  53   Projected Score  55   Assessment Type  Re-evaluation   G code set  Mobility: Walking & Moving Around   Mobility: Walking and Moving Around Current Status ()  CK   Mobility: Walking and Moving Around Goal Status ()  CJ        Precautions: Neuropathy, DM, Depression     Daily Treatment Diary      Manual             LE/LB stretching  10'  NT  10  10           Mobs  5'    5  5                                                                                        Exercise Diary    11/30 12/7 12/19         Water walking 15  15  15  15         Postural training 2  2  2  2         Gait training 3  3  3  1         Home exercise pgm/patient education  5'  2             Wall: t/h raises                      Hip abd/add                      Marching        2          squats                  Knee flex/ext                  Step-ups (fwd/bkwd/ss)                  SLS (eyes open/closed)                  SLS w UE mvmt  AROM/ball toss                  Weight shifting  5'  5  5  5          UE Noodle work x 4                  UE AROM                  Resistive UE work (paddles, bells, TB)                  Core work on noodle (sitting/stdg)                  Sit on noodle with movement                  Seated on pool bench w proper posture                  Ankle df/pf                  marching                  Hip Ab/add                  Knee flex/ext                  Deep water mvmt 5  5  5 10          Deep water tx/stretching 10  15  15'  15          Specific self - stretches wall/steps   3  3  3                Modalities  11/19 11/30 12/7 12/19         whirlpool 10'  5  5'  10           PAST SURGICAL HISTORY:   Section for Breech Presentation '     H/O breast reconstruction     Left Breast Biopsy ' . benign    S/P bilateral mastectomy

## 2024-06-12 ENCOUNTER — TRANSCRIPTION ENCOUNTER (OUTPATIENT)
Age: 44
End: 2024-06-12

## 2024-06-12 ENCOUNTER — OUTPATIENT (OUTPATIENT)
Dept: OUTPATIENT SERVICES | Facility: HOSPITAL | Age: 44
LOS: 1 days | Discharge: HOME | End: 2024-06-12
Payer: COMMERCIAL

## 2024-06-12 VITALS
DIASTOLIC BLOOD PRESSURE: 75 MMHG | RESPIRATION RATE: 15 BRPM | OXYGEN SATURATION: 100 % | HEART RATE: 59 BPM | SYSTOLIC BLOOD PRESSURE: 115 MMHG

## 2024-06-12 VITALS
HEIGHT: 63 IN | WEIGHT: 106.92 LBS | TEMPERATURE: 98 F | OXYGEN SATURATION: 100 % | DIASTOLIC BLOOD PRESSURE: 71 MMHG | RESPIRATION RATE: 16 BRPM | HEART RATE: 60 BPM | SYSTOLIC BLOOD PRESSURE: 116 MMHG

## 2024-06-12 DIAGNOSIS — Z90.13 ACQUIRED ABSENCE OF BILATERAL BREASTS AND NIPPLES: ICD-10-CM

## 2024-06-12 DIAGNOSIS — Z98.890 OTHER SPECIFIED POSTPROCEDURAL STATES: Chronic | ICD-10-CM

## 2024-06-12 DIAGNOSIS — Z90.13 ACQUIRED ABSENCE OF BILATERAL BREASTS AND NIPPLES: Chronic | ICD-10-CM

## 2024-06-12 LAB — HCG UR QL: NEGATIVE — SIGNIFICANT CHANGE UP

## 2024-06-12 PROCEDURE — 88304 TISSUE EXAM BY PATHOLOGIST: CPT | Mod: 26

## 2024-06-12 RX ORDER — ONDANSETRON 8 MG/1
4 TABLET, FILM COATED ORAL
Refills: 0 | Status: ACTIVE | OUTPATIENT
Start: 2024-06-12 | End: 2025-05-11

## 2024-06-12 RX ORDER — SODIUM CHLORIDE 9 MG/ML
1000 INJECTION, SOLUTION INTRAVENOUS
Refills: 0 | Status: ACTIVE | OUTPATIENT
Start: 2024-06-12 | End: 2025-05-11

## 2024-06-12 RX ORDER — FENTANYL CITRATE 50 UG/ML
50 INJECTION INTRAVENOUS
Refills: 0 | Status: DISCONTINUED | OUTPATIENT
Start: 2024-06-12 | End: 2024-06-12

## 2024-06-12 RX ORDER — ONDANSETRON 8 MG/1
4 TABLET, FILM COATED ORAL ONCE
Refills: 0 | Status: ACTIVE | OUTPATIENT
Start: 2024-06-12 | End: 2025-05-11

## 2024-06-12 RX ORDER — OXYCODONE HYDROCHLORIDE 5 MG/1
5 TABLET ORAL ONCE
Refills: 0 | Status: DISCONTINUED | OUTPATIENT
Start: 2024-06-12 | End: 2024-06-12

## 2024-06-12 RX ORDER — HYDROMORPHONE HYDROCHLORIDE 2 MG/ML
0.5 INJECTION INTRAMUSCULAR; INTRAVENOUS; SUBCUTANEOUS
Refills: 0 | Status: DISCONTINUED | OUTPATIENT
Start: 2024-06-12 | End: 2024-06-12

## 2024-06-12 RX ORDER — OXYCODONE HYDROCHLORIDE 5 MG/1
10 TABLET ORAL ONCE
Refills: 0 | Status: DISCONTINUED | OUTPATIENT
Start: 2024-06-12 | End: 2024-06-12

## 2024-06-12 NOTE — ASU DISCHARGE PLAN (ADULT/PEDIATRIC) - PROVIDER TOKENS
PROVIDER:[TOKEN:[[id number]],FOLLOWUP:[2 weeks]] PROVIDER:[TOKEN:[[id number]],FOLLOWUP:[2 weeks]],PROVIDER:[TOKEN:[[id number]],FOLLOWUP:[1 week]]

## 2024-06-12 NOTE — ASU DISCHARGE PLAN (ADULT/PEDIATRIC) - CARE PROVIDER_API CALL
Radha Kiser-Jackeline  Surgery  [address], [address]  [place] [address]  Phone: [phone]  Fax: [phone]  Follow Up Time: 2 weeks   Radha Kiser-Jackeline  Surgery  [address], [address]  [place] [address]  Phone: [phone]  Fax: [phone]  Follow Up Time: 2 weeks    Rosendo Chapman  Plastic Surgery  [address], [address]  [place] [address]  Phone: [phone]  Fax: [phone]  Follow Up Time: 1 week

## 2024-06-12 NOTE — ASU DISCHARGE PLAN (ADULT/PEDIATRIC) - ASU DC SPECIAL INSTRUCTIONSFT
BATHING: Please do not submerge wound underwater. You may shower and/or sponge bathe starting the day after your procedure.  ACTIVITY: No heavy lifting or straining. Otherwise, you may return to your usual level of physical activity. If you are taking narcotic pain medication (such as Percocet), do NOT drive a car, operate machinery or make important decisions.  DIET: Return to your usual diet.  NOTIFY YOUR SURGEON IF: You have any bleeding that does not stop, any pus draining from your wound, any fever (over 100.4 F) or chills, persistent nausea/vomiting, persistent diarrhea, or if your pain is not controlled on your discharge pain medications.  FOLLOW-UP:  1. Please call to make a follow-up appointment with Dr. Kiser within one week of discharge   2. Please follow up with your primary care physician in one week regarding your surgery take medications as previosuly perscribed by Dr. Chapman/Dr. Kiser      BATHING: Please do not submerge wound underwater. You may shower and/or sponge bathe starting the day after your procedure.  ACTIVITY: No heavy lifting or straining. Otherwise, you may return to your usual level of physical activity. If you are taking narcotic pain medication (such as Percocet), do NOT drive a car, operate machinery or make important decisions.  DIET: Return to your usual diet.  NOTIFY YOUR SURGEON IF: You have any bleeding that does not stop, any pus draining from your wound, any fever (over 100.4 F) or chills, persistent nausea/vomiting, persistent diarrhea, or if your pain is not controlled on your discharge pain medications.  FOLLOW-UP:  1. Please call to make a follow-up appointment with Dr. Kiser within one week of discharge   2. Please follow up with your primary care physician in one week regarding your surgery take medications as previously prescribed by Dr. Chapman/Dr. Kiser      BATHING: Please do not submerge wound underwater. You may shower and/or sponge bathe starting the day after your procedure.  ACTIVITY: No heavy lifting or straining. Otherwise, you may return to your usual level of physical activity. If you are taking narcotic pain medication (such as Percocet), do NOT drive a car, operate machinery or make important decisions.  DIET: Return to your usual diet.  NOTIFY YOUR SURGEON IF: You have any bleeding that does not stop, any pus draining from your wound, any fever (over 100.4 F) or chills, persistent nausea/vomiting, persistent diarrhea, or if your pain is not controlled on your discharge pain medications.  FOLLOW-UP:  1. Please call to make a follow-up appointment with Dr. Kiser within one week of discharge   2. Please follow up with your primary care physician in one week regarding your surgery

## 2024-06-12 NOTE — PACU DISCHARGE NOTE - MENTAL STATUS: PATIENT PARTICIPATION
Awake Detail Level: Zone Initiate Treatment: ketoconazole 2 % topical cream to the chest BID Plan: Medication sent to [facility] today.

## 2024-06-12 NOTE — ASU DISCHARGE PLAN (ADULT/PEDIATRIC) - NURSING INSTRUCTIONS
You received IV Tylenol for pain management at 8:15AM. Please DO NOT take any Tylenol (Acetaminophen) containing products, such as Vicodin, Percocet, Excedrin, and cold medications for the next 6 hours (until 2:15PM). DO NOT TAKE MORE THAN 4000 MG OF TYLENOL in a 24 hour period. You received IV Tylenol for pain management at 8:15AM. Please DO NOT take any Tylenol (Acetaminophen) containing products, such as Vicodin, Percocet, Excedrin, and cold medications for the next 6 hours (until 2:15PM). DO NOT TAKE MORE THAN 4000 MG OF TYLENOL in a 24 hour period., You received IV Tylenol for pain management at 8:15AM. Please DO NOT take any Tylenol (Acetaminophen) containing products, such as Vicodin, Percocet, Excedrin, and cold medications for the next 6 hours (until 2:15PM). DO NOT TAKE MORE THAN 4000 MG OF TYLENOL in a 24 hour period. You received IV Toradol for pain management at 8:15AM. Please DO NOT take Motrin/Ibuprofen/Advil/Aleve/NSAIDs (Non-Steroidal Anti-Inflammatory Drugs) for the next 6 hours (until 2:15PM).

## 2024-06-17 LAB — SURGICAL PATHOLOGY STUDY: SIGNIFICANT CHANGE UP

## 2024-08-30 ENCOUNTER — OUTPATIENT (OUTPATIENT)
Dept: OUTPATIENT SERVICES | Facility: HOSPITAL | Age: 44
LOS: 1 days | Discharge: HOME | End: 2024-08-30

## 2024-08-30 DIAGNOSIS — C50.919 MALIGNANT NEOPLASM OF UNSPECIFIED SITE OF UNSPECIFIED FEMALE BREAST: ICD-10-CM

## 2024-08-30 DIAGNOSIS — Z90.13 ACQUIRED ABSENCE OF BILATERAL BREASTS AND NIPPLES: Chronic | ICD-10-CM

## 2024-08-30 DIAGNOSIS — Z98.890 OTHER SPECIFIED POSTPROCEDURAL STATES: Chronic | ICD-10-CM

## 2024-09-03 ENCOUNTER — NON-APPOINTMENT (OUTPATIENT)
Age: 44
End: 2024-09-03

## 2024-09-04 ENCOUNTER — APPOINTMENT (OUTPATIENT)
Dept: HEMATOLOGY ONCOLOGY | Facility: CLINIC | Age: 44
End: 2024-09-04
Payer: COMMERCIAL

## 2024-09-04 VITALS
RESPIRATION RATE: 16 BRPM | WEIGHT: 109.13 LBS | SYSTOLIC BLOOD PRESSURE: 134 MMHG | DIASTOLIC BLOOD PRESSURE: 84 MMHG | HEIGHT: 62.99 IN | TEMPERATURE: 97.4 F | OXYGEN SATURATION: 99 % | HEART RATE: 67 BPM | BODY MASS INDEX: 19.34 KG/M2

## 2024-09-04 DIAGNOSIS — Z33.2 ENCOUNTER FOR ELECTIVE TERMINATION OF PREGNANCY: ICD-10-CM

## 2024-09-04 DIAGNOSIS — C50.911 MALIGNANT NEOPLASM OF UNSPECIFIED SITE OF RIGHT FEMALE BREAST: ICD-10-CM

## 2024-09-04 DIAGNOSIS — Z34.90 ENCOUNTER FOR SUPERVISION OF NORMAL PREGNANCY, UNSPECIFIED, UNSPECIFIED TRIMESTER: ICD-10-CM

## 2024-09-04 PROCEDURE — 99205 OFFICE O/P NEW HI 60 MIN: CPT

## 2024-09-04 RX ORDER — TAMOXIFEN CITRATE 20 MG/1
20 TABLET, FILM COATED ORAL
Qty: 30 | Refills: 2 | Status: ACTIVE | COMMUNITY
Start: 2024-09-04

## 2024-09-05 NOTE — PHYSICAL EXAM
[Fully active, able to carry on all pre-disease performance without restriction] : Status 0 - Fully active, able to carry on all pre-disease performance without restriction [Normal] : affect appropriate [de-identified] : B mastectomy with expander reconstruction and SLNB sites healed

## 2024-09-05 NOTE — CONSULT LETTER
[Dear  ___] : Dear  [unfilled], [Consult Letter:] : I had the pleasure of evaluating your patient, [unfilled]. [( Thank you for referring [unfilled] for consultation for _____ )] : Thank you for referring [unfilled] for consultation for [unfilled] [Please see my note below.] : Please see my note below. [Consult Closing:] : Thank you very much for allowing me to participate in the care of this patient.  If you have any questions, please do not hesitate to contact me. [Sincerely,] : Sincerely, [FreeTextEntry2] : Radha Kiser MD [address] [facility] [address] [phone]  [FreeTextEntry3] : Krunal Pope MD Attending [facility] [DrVinicius  ___] : Dr. BOOTH

## 2024-09-05 NOTE — ASSESSMENT
[FreeTextEntry1] : She is a 43 y/o premenopausal  F with Stage I right breast invasive ductal carcinoma moderately differentiated ER > 95%, CA >95%, Her2 nonamplified s/p right mastectomy and SLNB along with prophylactic left mastectomy with SLNB. We reviewed her Oncotype score and explained that while she has lowered her risk of local breast cancer recurrence, there is still risk of distant recurrence. We reviewed the role of endocrine therapy to reduce risk of distant recurrence. We reviewed without endocrine therapy: risk of breast cancer recurrence. We reviewed her concerns about tamoxifen therapy. We reviewed supportive measures to lessen AE. We reviewed the standard of care for surveillance of breast cancer recurrence: history and exam. We reviewed that minidose FAROOQ would not be applied in her case since she has invasive breast cancer and minidose was used for chemoprevention. We reviewed her concerns for tamoxifen and explained the less than 1% risk of uterine cancer and extremely low in premenopausal patients. We reviewed if she becomes postmenopausal during her 5 to 10 years of therapy: we reviewed switching to aromatase inhibitor to remove the risk of uterine cancer. Questions answered to her satisfaction. She is willing to try the medication. She will continue follow up with Dr Radha Kiser and she will consider if she wants to follow up with Dr Cardoza or us. She understands interval follow up on endocrine therapy would be in 3 months.

## 2024-09-05 NOTE — HISTORY OF PRESENT ILLNESS
[Disease: _____________________] : Disease: [unfilled] [T: ___] : T[unfilled] [N: ___] : N[unfilled] [AJCC Stage: ____] : AJCC Stage: [unfilled] [de-identified] : Age 44: right breast cancer Screen detected: she had interval mammogram/ sonogram done 11/2023 showing bilateral hypoechoic lesions with recommendation of 6 month follow up. She had interval breast imaging done which showed right 12:00 lesion. She underwent u/s guided biopsy done on 4/2024 which showed invasive ductal carcinoma well to moderately differentiated spanning 0.7 cm, ER > 95%, NC >95%, Her2 nonamplified (2) and DCIS. On 5/15/2024, she underwent right mastectomy with SLNB and prophylactic left mastectomy with Dr Radha Kiser. The pathology showed invasive ductal carcinoma moderately differentiated Grade 2, measuring 7.5 mm in greatest dimension; anterior margin spanning 4 mm and negative SLNB (0/3). The left mastectomy showing lobular carcinoma in situ classic type with negative SLNB (0/2). OncotypeDx recurrence score 10 indicating distant recurrence at 9 years at 3% on tamoxifen. She had excision of right chest wall anterior margin with skin on 6/12/2024. The right chest wall showed ductal carcinoma in situ: intermediate nuclear grade with focus of microinvasion and focus of atypical lobular hyperplasia. Seen by Dr Cardoza at [facility] and tamoxifen was recommended.  [de-identified] :  invasive ductal carcinoma ER > 95%, OR >95%, Her2 nonamplified (2)  [de-identified] : She is present for 2nd opinion. She has concerns about tamoxifen AE: uterine cancer risk. She also is wondering how this will affect her periods. She has read about minidose FAROOQ and wondering if it could be applied to her case. Periods currently every 21 days and lasting 5 days with last few days light.

## 2024-11-01 ENCOUNTER — APPOINTMENT (OUTPATIENT)
Dept: HEMATOLOGY ONCOLOGY | Facility: CLINIC | Age: 44
End: 2024-11-01

## 2025-04-15 ENCOUNTER — OUTPATIENT (OUTPATIENT)
Dept: OUTPATIENT SERVICES | Facility: HOSPITAL | Age: 45
LOS: 1 days | Discharge: HOME | End: 2025-04-15

## 2025-04-15 DIAGNOSIS — Z90.13 ACQUIRED ABSENCE OF BILATERAL BREASTS AND NIPPLES: Chronic | ICD-10-CM

## 2025-04-15 DIAGNOSIS — Z98.890 OTHER SPECIFIED POSTPROCEDURAL STATES: Chronic | ICD-10-CM

## 2025-04-17 ENCOUNTER — APPOINTMENT (OUTPATIENT)
Dept: HEMATOLOGY ONCOLOGY | Facility: CLINIC | Age: 45
End: 2025-04-17
Payer: COMMERCIAL

## 2025-04-17 VITALS
BODY MASS INDEX: 19.49 KG/M2 | DIASTOLIC BLOOD PRESSURE: 86 MMHG | OXYGEN SATURATION: 99 % | TEMPERATURE: 98.2 F | WEIGHT: 109.99 LBS | RESPIRATION RATE: 17 BRPM | SYSTOLIC BLOOD PRESSURE: 133 MMHG | HEART RATE: 75 BPM

## 2025-04-17 DIAGNOSIS — C50.911 MALIGNANT NEOPLASM OF UNSPECIFIED SITE OF RIGHT FEMALE BREAST: ICD-10-CM

## 2025-04-17 PROCEDURE — 99214 OFFICE O/P EST MOD 30 MIN: CPT

## 2025-05-13 ENCOUNTER — NON-APPOINTMENT (OUTPATIENT)
Age: 45
End: 2025-05-13

## 2025-07-09 ENCOUNTER — APPOINTMENT (OUTPATIENT)
Dept: HEMATOLOGY ONCOLOGY | Facility: CLINIC | Age: 45
End: 2025-07-09

## (undated) DEVICE — MEDTRONIC RADIALUX LIGHTED RETRACTOR DISP

## (undated) DEVICE — ELCTR BOVIE TIP BLADE INSULATED 6.5" EDGE

## (undated) DEVICE — CANISTER DISPOSABLE THIN WALL 3000CC

## (undated) DEVICE — DRAPE LAPAROTOMY TRANSVERSE

## (undated) DEVICE — PACK MINOR VALUE CUSTOM

## (undated) DEVICE — DRSG DERMABOND 0.7ML

## (undated) DEVICE — GAMMA SLEEVE DISPOSABLE

## (undated) DEVICE — STAPLER SKIN VISI-STAT 35 WIDE

## (undated) DEVICE — WARMING BLANKET FULL ADULT

## (undated) DEVICE — DRSG BENZOIN 0.6CC

## (undated) DEVICE — DRAIN JACKSON PRATT 10MM FLAT FULL NO TROCAR

## (undated) DEVICE — ELCTR BOVIE TIP BLADE MEGADYNE E-Z CLEAN 6.5" (LONG)

## (undated) DEVICE — DRSG XEROFORM 5 X 9"

## (undated) DEVICE — DRAPE CHEST BREAST 106" X 122"

## (undated) DEVICE — GLV 8 PROTEXIS (WHITE)

## (undated) DEVICE — DRSG COMBINE 5X9"

## (undated) DEVICE — STAPLER SKIN MULTI DIRECTION W35

## (undated) DEVICE — DRAIN JACKSON PRATT 7MM FLAT FULL NO TROCAR

## (undated) DEVICE — DRSG TAPE MEDIPORE 6"

## (undated) DEVICE — GLV 7 PROTEXIS (CREAM) MICRO

## (undated) DEVICE — SUT MONOCRYL 3-0 18" PS-2 UNDYED

## (undated) DEVICE — SUT VICRYL 2-0 27" SH UNDYED

## (undated) DEVICE — SHEATH SURG GUIDE SCOUT DISP STRL

## (undated) DEVICE — DRAPE INSTRUMENT POUCH 6.75" X 11"

## (undated) DEVICE — ELCTR BOVIE TIP BLADE INSULATED 2.75" EDGE WITH SAFETY

## (undated) DEVICE — ELCTR BOVIE PENCIL BLADE 10FT

## (undated) DEVICE — SUT VICRYL 3-0 27" SH UNDYED

## (undated) DEVICE — SUT NYLON 2-0 18" FS

## (undated) DEVICE — LIGASURE SMALL JAW

## (undated) DEVICE — DRAPE IOBAN 23" X 23"

## (undated) DEVICE — TUBING SUCTION NONCONDUCTIVE 6MM X 12FT

## (undated) DEVICE — BLADE SURGICAL #15 CARBON

## (undated) DEVICE — SUT PLAIN GUT FAST ABSORBING 5-0 PC-1

## (undated) DEVICE — SUT MONOCRYL 4-0 27" PS-2 UNDYED

## (undated) DEVICE — DRAPE FLUID WARMER 44 X 44"

## (undated) DEVICE — Device

## (undated) DEVICE — GLV 6.5 PROTEXIS (CREAM) MICRO

## (undated) DEVICE — NDL HYPO SAFE 25G X 1.5" (ORANGE)

## (undated) DEVICE — SYR LUER LOK 10CC

## (undated) DEVICE — STOPCOCK 3 WAY W SWIVEL MALE LUER LOCK

## (undated) DEVICE — MARKING PEN W RULER

## (undated) DEVICE — ELCTR BOVIE TIP BLADE INSULATED 2.8" EDGE WITH SAFETY

## (undated) DEVICE — DRSG STERISTRIPS 0.5 X 4"

## (undated) DEVICE — POSITIONER STRAP ARMBOARD VELCRO TS-30

## (undated) DEVICE — DRSG TAPE MEDIPORE 3"

## (undated) DEVICE — SYR LUER LOK 5CC

## (undated) DEVICE — ELCTR GROUNDING PAD ADULT COVIDIEN

## (undated) DEVICE — DRSG BIOPATCH DISK W CHG 1" W 4.0MM HOLE

## (undated) DEVICE — SUT SILK 2-0 18" FS

## (undated) DEVICE — BAG DECANTER DISP

## (undated) DEVICE — DRSG DERMABOND PRINEO 60CM

## (undated) DEVICE — BASIN SET DOUBLE

## (undated) DEVICE — DRAPE 3/4 SHEET 52X76"

## (undated) DEVICE — SUT VICRYL 3-0 18" PS-2 UNDYED

## (undated) DEVICE — SAFETY PIN

## (undated) DEVICE — ELCTR BOVIE PENCIL SMOKE EVACUATION

## (undated) DEVICE — DRSG CURITY GAUZE SPONGE 4 X 4" 12-PLY

## (undated) DEVICE — PACK MAJOR ABDOMINAL WITH LAP

## (undated) DEVICE — SYR LUER LOK 50CC

## (undated) DEVICE — LABELS BLANK W PEN

## (undated) DEVICE — SPECIMEN CONTAINER 100ML

## (undated) DEVICE — VENODYNE/SCD SLEEVE CALF MEDIUM

## (undated) DEVICE — LAP PAD W RING 18 X 18"

## (undated) DEVICE — DRSG TELFA 3 X 8

## (undated) DEVICE — SOL IRR POUR H2O 1500ML

## (undated) DEVICE — SAVI SCOUT HANDPIECE

## (undated) DEVICE — ONETRAC LIGHTED RETRACTOR 135 X 30MM DISP

## (undated) DEVICE — CANISTER SUCTION 3000ML

## (undated) DEVICE — GOWN IMPERV XL

## (undated) DEVICE — ELCTR BOVIE TIP BLADE INSULATED 2.75" EDGE

## (undated) DEVICE — DRAIN RESERVOIR FOR JACKSON PRATT 100CC CARDINAL

## (undated) DEVICE — GLV 7.5 PROTEXIS (CREAM) MICRO

## (undated) DEVICE — TUBING IRR SET FOR CYSTOSCOPY 77"

## (undated) DEVICE — CANISTER SUCTION LID GUARD 3000CC

## (undated) DEVICE — NDL HYPO SAFE 18G X 1.5" (PINK)